# Patient Record
Sex: FEMALE | Race: WHITE | NOT HISPANIC OR LATINO | Employment: STUDENT | URBAN - NONMETROPOLITAN AREA
[De-identification: names, ages, dates, MRNs, and addresses within clinical notes are randomized per-mention and may not be internally consistent; named-entity substitution may affect disease eponyms.]

---
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
## 2025-01-29 ENCOUNTER — OFFICE VISIT (OUTPATIENT)
Dept: PSYCHIATRY | Facility: CLINIC | Age: 20
End: 2025-01-29
Payer: COMMERCIAL

## 2025-01-29 DIAGNOSIS — F41.1 GENERALIZED ANXIETY DISORDER: ICD-10-CM

## 2025-01-29 DIAGNOSIS — F33.2 SEVERE EPISODE OF RECURRENT MAJOR DEPRESSIVE DISORDER, WITHOUT PSYCHOTIC FEATURES: Primary | ICD-10-CM

## 2025-01-29 NOTE — PROGRESS NOTES
NAME: Lashon Harvey  DATE: 01/29/2025    Time:   4930-4526      DATA:          Individual Psychotherapy Session: Therapist encouraged patient to check-in regarding symptoms and how things are going. Therapist encouraged patient to share thoughts and feelings about being in group. Patient and therapist collaborate to update and develop individualized treatment goals. Therapist assisted patient in exploring thoughts and feelings surrounding something that's bothering patient. Therapist provided empathic listening.       Patient Response:     Patient arrived in person and participated in therapy today. Patient shared that she dropped out of college and is moving out of her boyfriend's apartment tomorrow because she realized she is miserable there. Reports she will move in with her mother in [place] and save up money to move to [place] to attend [employer] for music. Reports plans to get her drivers license. Patient reports daily suicidal thoughts, denies intent and plan. Patient reports self harm relapse a few weeks ago impulsively while in the shower, superficial cuts to leg. Reports watching self-harm content online the next day and since then she's been regularly self harming and watching self harm content. Therapist assists patient in creating plan to stop self harming. Patient deleted the wagner with the self harm content and deleted pictures she'd taken of her cuts. Patient agrees to try self harm coping mechanisms such as holding ice against the skin, screaming, hitting things, and drawing on skin.     Chief Complaint: self harm    ASSESSMENT:    PHQ-Score Total:  PHQ-9 Total Score:       NOELLE-7 Score Total:       MENTAL STATUS EXAM   General Appearance:  Cleanly groomed and dressed  Eye Contact:  Good eye contact  Attitude:  Cooperative  Motor Activity:  Normal gait, posture  Speech:  Normal rate, tone, volume  Mood and affect:  Normal, pleasant  Thought Process:  Logical  Associations/ Thought Content:  No  delusions  Hallucinations:  None  Suicidal Ideations:  Not present  Homicidal Ideation:  Not present  Insight:  Good  Judgement:  Good  Reliability:  Good  Impulse Control:  Good      Functional Status: Moderate impairment     Progress toward goal: Not at goal    Prognosis: Good with Ongoing Treatment     PLAN:  Patient will continue in therapy to work towards goals including decreased anxiety and depressed mood, increased ability to cope with symptoms, and establishment of a support network.     Impression/Formulation:    ICD-10-CM ICD-9-CM   1. Severe episode of recurrent major depressive disorder, without psychotic features  F33.2 296.33   2. Generalized anxiety disorder  F41.1 300.02       CLINICAL MANUVERING/INTERVENTIONS:  Therapist utilized a person-centered approach to build rapport with patient.  Therapist implemented motivational interviewing techniques to assist patient with exploring personal growth and change.   Therapist applied cognitive behavioral strategies to facilitate identification of maladaptive patterns of thinking and behavior. Therapist promoted safe nonjudgmental environment by providing patient with unconditional positive regard.  Therapist utilized dialectical behavior techniques to teach and model emotional regulation and relaxation.  Therapist assisted patient with identifying and implementing healthier coping strategies.  Patient was encouraged to make positive daily choices, and maintain healthy boundaries.        This document signed by Hakeem Kaur [facility], January 29, 2025, 12:14 EST

## 2025-02-12 ENCOUNTER — OFFICE VISIT (OUTPATIENT)
Dept: PSYCHIATRY | Facility: CLINIC | Age: 20
End: 2025-02-12
Payer: COMMERCIAL

## 2025-02-12 DIAGNOSIS — F33.2 SEVERE EPISODE OF RECURRENT MAJOR DEPRESSIVE DISORDER, WITHOUT PSYCHOTIC FEATURES: Primary | ICD-10-CM

## 2025-02-12 DIAGNOSIS — F41.1 GENERALIZED ANXIETY DISORDER: ICD-10-CM

## 2025-02-13 NOTE — PROGRESS NOTES
"     NAME: Lashon Harvey  DATE: 02/12/2025    Time:   0100-6250      DATA:          Individual Psychotherapy Session: Therapist encouraged patient to check-in regarding symptoms and how things are going. Therapist encouraged patient to share thoughts and feelings about being in group. Patient and therapist collaborate to update and develop individualized treatment goals. Therapist assisted patient in exploring thoughts and feelings surrounding something that's bothering patient. Therapist provided empathic listening.       Patient Response:     Patient arrived in person and participated in therapy today. Patient shared that she moved back home to her mom's house and also passed her 's test and got her learner's permit. Reports she hasn't been self-harming despite daily having urges to self-harm. Reports living at home is stressful because of all the arguing and having her mother and sister around all the time but it is better than living with her boyfriend. Patient reports that she recently had a \"meltdown\" in [facility] because of the color of the bright fluorescent lights, states that it was hurting her head and made her aware of how her clothes felt on her body and it made her cry and have to leave the store. Patient states that happens to her occasionally. Therapist provides education about sensory issues. Therapist assists patient in identifying goals for treatment. Patient identifies the following goals: increase self esteem, less mean to people, impulse control, be able to approach strangers, not let anxiety stop her from doing things, improved hygiene, make more time for her hobbies instead of being on her phone.     Chief Complaint: stress    ASSESSMENT:    PHQ-Score Total:  PHQ-9 Total Score:       NOELLE-7 Score Total:       MENTAL STATUS EXAM   General Appearance:  Cleanly groomed and dressed  Eye Contact:  Good eye contact  Attitude:  Cooperative  Motor Activity:  Normal gait, posture  Speech:  Normal " rate, tone, volume  Mood and affect:  Normal, pleasant  Thought Process:  Logical  Associations/ Thought Content:  No delusions  Hallucinations:  None  Suicidal Ideations:  Passive ideation  Homicidal Ideation:  Not present  Insight:  Good  Judgement:  Good  Reliability:  Good  Impulse Control:  Good      Functional Status: Moderate impairment     Progress toward goal: Not at goal    Prognosis: Good with Ongoing Treatment     PLAN:  Patient will continue in therapy to work towards goals including decreased anxiety and depressed mood, increased ability to cope with symptoms, and establishment of a support network.     Impression/Formulation:    ICD-10-CM ICD-9-CM   1. Severe episode of recurrent major depressive disorder, without psychotic features  F33.2 296.33   2. Generalized anxiety disorder  F41.1 300.02       CLINICAL MANUVERING/INTERVENTIONS:  Therapist utilized a person-centered approach to build rapport with patient.  Therapist implemented motivational interviewing techniques to assist patient with exploring personal growth and change.   Therapist applied cognitive behavioral strategies to facilitate identification of maladaptive patterns of thinking and behavior. Therapist promoted safe nonjudgmental environment by providing patient with unconditional positive regard.  Therapist utilized dialectical behavior techniques to teach and model emotional regulation and relaxation.  Therapist assisted patient with identifying and implementing healthier coping strategies.  Patient was encouraged to make positive daily choices, and maintain healthy boundaries.        This document signed by Hakeem Kaur [facility], February 13, 2025, 10:45 EST

## 2025-02-25 ENCOUNTER — OFFICE VISIT (OUTPATIENT)
Dept: PSYCHIATRY | Facility: CLINIC | Age: 20
End: 2025-02-25
Payer: COMMERCIAL

## 2025-02-25 DIAGNOSIS — F41.1 GENERALIZED ANXIETY DISORDER: ICD-10-CM

## 2025-02-25 DIAGNOSIS — F33.2 SEVERE EPISODE OF RECURRENT MAJOR DEPRESSIVE DISORDER, WITHOUT PSYCHOTIC FEATURES: Primary | ICD-10-CM

## 2025-03-12 ENCOUNTER — OFFICE VISIT (OUTPATIENT)
Dept: PSYCHIATRY | Facility: CLINIC | Age: 20
End: 2025-03-12
Payer: COMMERCIAL

## 2025-03-12 DIAGNOSIS — F41.1 GENERALIZED ANXIETY DISORDER: ICD-10-CM

## 2025-03-12 DIAGNOSIS — F33.2 SEVERE EPISODE OF RECURRENT MAJOR DEPRESSIVE DISORDER, WITHOUT PSYCHOTIC FEATURES: Primary | ICD-10-CM

## 2025-03-12 NOTE — PROGRESS NOTES
NAME: Lashon Harvey  DATE: 03/12/2025    Time:   3999-1014      DATA:          Individual Psychotherapy Session: Therapist encouraged patient to check-in regarding symptoms and how things are going. Therapist encouraged patient to share thoughts and feelings about being in group. Patient and therapist collaborate to update and develop individualized treatment goals. Therapist assisted patient in exploring thoughts and feelings surrounding something that's bothering patient. Therapist provided empathic listening.       Patient Response:     Patient arrived in person and participated in therapy today. Patient shared that she quit her job because she hated it. Reports that her mother is moving to a house where patient will have her own space, patient is excited about this. Patient states that she can't seem to say no to people for fear of hurting their feelings. Reports her fear of upsetting people is because she wants people to like her. Therapist encourages patient to try to say no to someone this week. Therapist provides education about assertiveness.     Chief Complaint: passivity    ASSESSMENT:    PHQ-Score Total:  PHQ-9 Total Score:       NOELLE-7 Score Total:       MENTAL STATUS EXAM   General Appearance:  Cleanly groomed and dressed  Eye Contact:  Good eye contact  Attitude:  Cooperative  Motor Activity:  Normal gait, posture  Speech:  Normal rate, tone, volume  Mood and affect:  Normal, pleasant  Thought Process:  Logical  Associations/ Thought Content:  No delusions  Hallucinations:  None  Suicidal Ideations:  Passive ideation  Homicidal Ideation:  Not present  Insight:  Good  Judgement:  Good  Reliability:  Good  Impulse Control:  Good      Functional Status: Moderate impairment     Progress toward goal: Not at goal    Prognosis: Good with Ongoing Treatment     PLAN:  Patient will continue in therapy to work towards goals including decreased anxiety and depressed mood, increased ability to cope with  symptoms, and establishment of a support network.     Impression/Formulation:    ICD-10-CM ICD-9-CM   1. Severe episode of recurrent major depressive disorder, without psychotic features  F33.2 296.33   2. Generalized anxiety disorder  F41.1 300.02       CLINICAL MANUVERING/INTERVENTIONS:  Therapist utilized a person-centered approach to build rapport with patient.  Therapist implemented motivational interviewing techniques to assist patient with exploring personal growth and change.   Therapist applied cognitive behavioral strategies to facilitate identification of maladaptive patterns of thinking and behavior. Therapist promoted safe nonjudgmental environment by providing patient with unconditional positive regard.  Therapist utilized dialectical behavior techniques to teach and model emotional regulation and relaxation.  Therapist assisted patient with identifying and implementing healthier coping strategies.  Patient was encouraged to make positive daily choices, and maintain healthy boundaries.        This document signed by Hakeem Kaur [facility], March 12, 2025, 15:44 EDT
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
## 2025-04-29 ENCOUNTER — OFFICE VISIT (OUTPATIENT)
Dept: PSYCHIATRY | Facility: CLINIC | Age: 20
End: 2025-04-29
Payer: COMMERCIAL

## 2025-04-29 DIAGNOSIS — F33.2 SEVERE EPISODE OF RECURRENT MAJOR DEPRESSIVE DISORDER, WITHOUT PSYCHOTIC FEATURES: Primary | ICD-10-CM

## 2025-04-29 DIAGNOSIS — F41.1 GENERALIZED ANXIETY DISORDER: ICD-10-CM

## 2025-04-29 PROCEDURE — 90834 PSYTX W PT 45 MINUTES: CPT

## 2025-04-29 NOTE — PROGRESS NOTES
NAME: Lashon Harvey  DATE: 04/29/2025    Time:   1412-2402      DATA:          Individual Psychotherapy Session: Therapist encouraged patient to check-in regarding symptoms and how things are going. Therapist encouraged patient to share thoughts and feelings about being in group. Patient and therapist collaborate to update and develop individualized treatment goals. Therapist assisted patient in exploring thoughts and feelings surrounding something that's bothering patient. Therapist provided empathic listening.       Patient Response:     Patient arrived in person and participated in therapy today. Patient shared that she broke up with her boyfriend which is a relief. Reports she has started using THC, is very stressed about her living situation, cannot find a job, is going through a lot of friend drama, and has started cutting again. Patient states that she is very stressed because of arguments at home with her family and wanting to not live there but not being able to leave and support herself financially with no 's license or income. Patient states that she does have four interviews this week. Patient reports she has thoughts of dying by suicide using her mother's blood pressure medication. Patient denies intent. Patient agrees to ask her mother to hide the medication.     Chief Complaint: stress    ASSESSMENT:    PHQ-Score Total:  PHQ-9 Total Score:       NOELLE-7 Score Total:       MENTAL STATUS EXAM   General Appearance:  Cleanly groomed and dressed  Eye Contact:  Good eye contact  Attitude:  Cooperative  Motor Activity:  Normal gait, posture  Speech:  Normal rate, tone, volume  Mood and affect:  Normal, pleasant  Thought Process:  Logical  Associations/ Thought Content:  No delusions  Hallucinations:  None  Suicidal Ideations:  Specific thoughts but denies intent  Homicidal Ideation:  Not present  Insight:  Good  Judgement:  Good  Reliability:  Good  Impulse Control:  Good      Functional Status:  Moderate impairment     Progress toward goal: Not at goal    Prognosis: Fair with Ongoing Treatment     PLAN:  Patient will continue in therapy to work towards goals including decreased anxiety and depressed mood, increased ability to cope with symptoms, and establishment of a support network.     Impression/Formulation:    ICD-10-CM ICD-9-CM   1. Severe episode of recurrent major depressive disorder, without psychotic features  F33.2 296.33   2. Generalized anxiety disorder  F41.1 300.02       CLINICAL MANUVERING/INTERVENTIONS:  Therapist utilized a person-centered approach to build rapport with patient.  Therapist implemented motivational interviewing techniques to assist patient with exploring personal growth and change.   Therapist applied cognitive behavioral strategies to facilitate identification of maladaptive patterns of thinking and behavior. Therapist promoted safe nonjudgmental environment by providing patient with unconditional positive regard.  Therapist utilized dialectical behavior techniques to teach and model emotional regulation and relaxation.  Therapist assisted patient with identifying and implementing healthier coping strategies.  Patient was encouraged to make positive daily choices, and maintain healthy boundaries.        This document signed by Hakeem Kaur [facility], April 29, 2025, 16:25 EDT

## 2025-05-22 ENCOUNTER — TELEPHONE (OUTPATIENT)
Dept: CARDIOLOGY | Facility: CLINIC | Age: 20
End: 2025-05-22
Payer: COMMERCIAL

## 2025-05-22 NOTE — TELEPHONE ENCOUNTER
Caller: Lashon Harvey    Relationship to patient: Self    Best call back number: [phone] (home)     Chief complaint: LIGHTHEADEDNESS/ CHEST PAIN - NO ACTIVE SYMPTOMS    Type of visit: F/U    Requested date: NEXT AVAILABLE    Additional notes:PT IS SAYING SHE NEEDS A CLEARANCE TO DRIVE DUE TO A HISTORY OR [employer]. PLEASE CALL PT WHEN AVAILABLE.

## 2025-05-23 ENCOUNTER — TELEPHONE (OUTPATIENT)
Dept: CARDIOLOGY | Facility: CLINIC | Age: 20
End: 2025-05-23
Payer: COMMERCIAL

## 2025-05-28 ENCOUNTER — OFFICE VISIT (OUTPATIENT)
Dept: CARDIOLOGY | Facility: CLINIC | Age: 20
End: 2025-05-28
Payer: COMMERCIAL

## 2025-05-28 VITALS
BODY MASS INDEX: 24.03 KG/M2 | DIASTOLIC BLOOD PRESSURE: 73 MMHG | HEART RATE: 70 BPM | HEIGHT: 63 IN | SYSTOLIC BLOOD PRESSURE: 112 MMHG | OXYGEN SATURATION: 99 % | WEIGHT: 135.6 LBS | RESPIRATION RATE: 16 BRPM

## 2025-05-28 DIAGNOSIS — R55 SYNCOPE AND COLLAPSE: Primary | ICD-10-CM

## 2025-05-28 PROCEDURE — 93000 ELECTROCARDIOGRAM COMPLETE: CPT | Performed by: NURSE PRACTITIONER

## 2025-05-28 PROCEDURE — 1160F RVW MEDS BY RX/DR IN RCRD: CPT | Performed by: NURSE PRACTITIONER

## 2025-05-28 PROCEDURE — 99213 OFFICE O/P EST LOW 20 MIN: CPT | Performed by: NURSE PRACTITIONER

## 2025-05-28 PROCEDURE — 1159F MED LIST DOCD IN RCRD: CPT | Performed by: NURSE PRACTITIONER

## 2025-05-28 NOTE — PROGRESS NOTES
[facility]             [facility] BERNARDO Lopes Mary Jean, APRN  Lashon Harvey  2005 05/28/2025    Patient Active Problem List   Diagnosis    Breast pain    Heart murmur    Dizziness    Palpitations    Adjustment disorder with mixed anxiety and depressed mood    Post traumatic stress disorder (PTSD)    Medication management    Syncope and collapse       Dear Tesha Dye APRN:    Subjective     Chief Complaint   Patient presents with    Heart Murmur     With syncope past hx    Med Management     verbal       HPI:     This is a 20 y.o. female with known past medical history of heart murmur.       Lashon Harvey presents today for routine cardiology follow up.   History of Present Illness  Patient has not been seen in our office since 2023 for which she was following for syncopal episodes.  Underwent cardiac workup at that time which showed echocardiogram with normal LV function and valvular function.  Cardiac event monitor at that time showed no arrhythmias.  She presents today stating that she needs clearance to drive as the DMV will not give her her license due to having history of syncope she has had no syncopal episodes since she was last seen in 2023.  It was felt that her syncopal episode previously was related to anxiety.  Blood pressure well-controlled.  Denies any chest pain or shortness of breath       Diagnostic Testing  Echocardiogram 6/2023: EF 61 to 65%  Cardiac event monitor 6/2023: Sinus rhythm no arrhythmias noted       All other systems were reviewed and were negative.    Patient Active Problem List   Diagnosis    Breast pain    Heart murmur    Dizziness    Palpitations    Adjustment disorder with mixed anxiety and depressed mood    Post traumatic stress disorder (PTSD)    Medication management    Syncope and collapse       family history includes Anxiety disorder in her father, mother, and sister; Depression in her father, mother, and  sister; Hypertension in her maternal grandmother; OCD in her sister.     reports that she has never smoked. She has never used smokeless tobacco. She reports that she does not drink alcohol and does not use drugs.    No Known Allergies      Current Outpatient Medications:     hydrOXYzine (ATARAX) 10 MG tablet, Take 1 tablet by mouth 3 (Three) Times a Day As Needed for Anxiety. Indications: Feeling Anxious, Disp: 90 tablet, Rfl: 0    loratadine (CLARITIN) 10 MG tablet, Take 1 tablet by mouth Daily., Disp: , Rfl:     mupirocin (BACTROBAN) 2 % ointment, APPLY SMALL AMOUNT TOPICALLY TO THE AFFECTED AREA TWICE DAILY FOR 10 DAYS, Disp: , Rfl:     sertraline (ZOLOFT) 25 MG tablet, Take 1 tablet by mouth Daily for 30 days., Disp: 30 tablet, Rfl: 0      Physical Exam:  I have reviewed the patient's current vital signs as documented in the patient's EMR.   Vitals:    05/28/25 1109   BP: 112/73   Pulse: 70   Resp: 16   SpO2: 99%     Body mass index is 24.02 kg/m².       05/28/25  1109   Weight: 61.5 kg (135 lb 9.6 oz)      Physical Exam  Constitutional:       General: She is not in acute distress.     Appearance: Normal appearance. She is well-developed and normal weight.   HENT:      Head: Normocephalic and atraumatic.   Eyes:      General: Lids are normal.      Conjunctiva/sclera: Conjunctivae normal.      Pupils: Pupils are equal, round, and reactive to light.   Neck:      Vascular: No carotid bruit or JVD.   Cardiovascular:      Rate and Rhythm: Normal rate and regular rhythm.      Pulses: Normal pulses.      Heart sounds: Normal heart sounds, S1 normal and S2 normal. No murmur heard.  Pulmonary:      Effort: Pulmonary effort is normal. No respiratory distress.      Breath sounds: Normal breath sounds. No wheezing.   Abdominal:      General: Bowel sounds are normal. There is no distension.      Palpations: Abdomen is soft. There is no hepatomegaly or splenomegaly.      Tenderness: There is no abdominal tenderness.    Musculoskeletal:         General: No swelling. Normal range of motion.      Cervical back: Normal range of motion and neck supple.      Right lower leg: No edema.      Left lower leg: No edema.   Skin:     General: Skin is warm and dry.      Coloration: Skin is not jaundiced.      Findings: No rash.   Neurological:      General: No focal deficit present.      Mental Status: She is alert and oriented to person, place, and time. Mental status is at baseline.   Psychiatric:         Mood and Affect: Mood normal.         Speech: Speech normal.         Behavior: Behavior normal.         Thought Content: Thought content normal.         Judgment: Judgment normal.          DATA REVIEWED:     TTE/RAMIRO:  Results for orders placed during the hospital encounter of 06/07/23    Adult Transthoracic Echo Complete w/ Color, Spectral and Contrast if necessary per protocol    Interpretation Summary    Left ventricular systolic function is normal. Left ventricular ejection fraction appears to be 61 - 65%.    Left ventricular diastolic function was normal.    Estimated right ventricular systolic pressure from tricuspid regurgitation is normal (<35 mmHg).      Laboratory evaluations:    Lab Results   Component Value Date    GLUCOSE 84 10/05/2023    BUN 12 10/05/2023    CREATININE 0.76 10/05/2023    EGFRIFNONA  12/18/2019      Comment:      Unable to calculate GFR, patient age <=18.    EGFRIFAFRI  12/18/2019      Comment:      Unable to calculate GFR, patient age <=18.    BCR 15.8 10/05/2023    K 3.8 10/05/2023    CO2 26.1 10/05/2023    CALCIUM 9.2 10/05/2023    ALBUMIN 4.4 10/05/2023    AST 22 10/05/2023    ALT 18 10/05/2023     Lab Results   Component Value Date    WBC 7.55 10/05/2023    HGB 13.0 10/05/2023    HCT 38.3 10/05/2023    MCV 92.5 10/05/2023     10/05/2023     Lab Results   Component Value Date    CHOL 128 06/09/2022    TRIG 45 06/09/2022    HDL 52 06/09/2022    LDL 65 06/09/2022     Lab Results   Component Value Date  "   TSH 1.110 06/09/2022     Lab Results   Component Value Date    HGBA1C 4.40 (L) 06/09/2022     Lab Results   Component Value Date    ALT 18 10/05/2023     Lab Results   Component Value Date    HGBA1C 4.40 (L) 06/09/2022     Lab Results   Component Value Date    CREATININE 0.76 10/05/2023     No results found for: \"IRON\", \"TIBC\", \"FERRITIN\"  No results found for: \"INR\", \"PROTIME\"   No components found for: \"ABSOLUTELUNG\"    ECG 12 Lead    Date/Time: 5/28/2025 11:43 AM  Performed by: Kamala Lopes APRN    Authorized by: Kamala Lopes APRN  Comparison: compared with previous ECG   Rhythm: sinus rhythm  Rate: normal    Clinical impression: normal ECG         --------------------------------------------------------------------------------------------------------------------------    ASSESSMENT/PLAN:      Diagnosis Plan   1. Syncope and collapse          Assessment & Plan  1. Medical clearance for driving  2. Hisotry of syncope  - No fainting episodes since 2023.  - Echocardiogram from 2023 showed no significant valve issues and normal LV fucntion  - Normal EKG today.  - Blood pressure within normal range.  - No cardiac concerns preventing driving.  - Note to be provided to [facility] indicating medical clearance for driving.      This document has been @Electronically signed by BERNARDO Sauceda, 05/28/25, 11:17 AM EDT.       Dictated Utilizing Dragon Dictation: Part of this note may be an electronic transcription/translation of spoken language to printed text using the Dragon Dictation System.  Patient or patient representative verbalized consent for the use of Ambient Listening during the visit with  BERNARDO Sauceda for chart documentation. 5/28/2025  11:43 EDT    Follow-up appointment and medication changes provided in hand delivered After Visit Summary as well as reviewed in the room.       "

## 2025-06-13 ENCOUNTER — OFFICE VISIT (OUTPATIENT)
Dept: PSYCHIATRY | Facility: CLINIC | Age: 20
End: 2025-06-13
Payer: COMMERCIAL

## 2025-06-13 ENCOUNTER — TELEPHONE (OUTPATIENT)
Dept: CARDIOLOGY | Facility: CLINIC | Age: 20
End: 2025-06-13
Payer: COMMERCIAL

## 2025-06-13 DIAGNOSIS — F33.2 SEVERE EPISODE OF RECURRENT MAJOR DEPRESSIVE DISORDER, WITHOUT PSYCHOTIC FEATURES: Primary | ICD-10-CM

## 2025-06-13 DIAGNOSIS — F41.1 GENERALIZED ANXIETY DISORDER: ICD-10-CM

## 2025-06-13 NOTE — PROGRESS NOTES
NAME: Lashon Harvey  DATE: 06/13/2025    Time:   7063-0889      DATA:          Individual Psychotherapy Session: Therapist encouraged patient to check-in regarding symptoms and how things are going. Therapist encouraged patient to share thoughts and feelings about being in group. Patient and therapist collaborate to update and develop individualized treatment goals. Therapist assisted patient in exploring thoughts and feelings surrounding something that's bothering patient. Therapist provided empathic listening.       Patient Response:     Patient arrived in person and participated in therapy today. Patient shared that she got a new job and a new boyfriend. Reports that her homelife has been very stressful and she feels like she has to walk on egg shells. Reports her mom snaps at her about a lot of things. Reports feeling like she can't speak her mind or else her mom will yell at her. Therapist provides education about boundary setting. Patient talks about feeling very sad at the prospect of eventually leaving the area if she moves to where her boyfriend lives, sad because she would be leaving her mom who only seems to rely on patient for emotional support. Reports feeling like she is all that her mom has.     Chief Complaint: family issues    ASSESSMENT:    PHQ-Score Total:  PHQ-9 Total Score:       NOELLE-7 Score Total:       MENTAL STATUS EXAM   General Appearance:  Cleanly groomed and dressed  Eye Contact:  Good eye contact  Attitude:  Cooperative  Motor Activity:  Normal gait, posture  Speech:  Normal rate, tone, volume  Mood and affect:  Normal, pleasant  Thought Process:  Logical  Associations/ Thought Content:  No delusions  Hallucinations:  None  Suicidal Ideations:  Passive ideation  Homicidal Ideation:  Not present  Insight:  Fair  Judgement:  Good  Reliability:  Good  Impulse Control:  Good      Functional Status: Moderate impairment     Progress toward goal: Not at goal    Prognosis: Good with Ongoing  Treatment     PLAN:  Patient will continue in therapy to work towards goals including decreased anxiety and depressed mood, increased ability to cope with symptoms, and establishment of a support network.     Impression/Formulation:    ICD-10-CM ICD-9-CM   1. Severe episode of recurrent major depressive disorder, without psychotic features  F33.2 296.33   2. Generalized anxiety disorder  F41.1 300.02       CLINICAL MANUVERING/INTERVENTIONS:  Therapist utilized a person-centered approach to build rapport with patient.  Therapist implemented motivational interviewing techniques to assist patient with exploring personal growth and change.   Therapist applied cognitive behavioral strategies to facilitate identification of maladaptive patterns of thinking and behavior. Therapist promoted safe nonjudgmental environment by providing patient with unconditional positive regard.  Therapist utilized dialectical behavior techniques to teach and model emotional regulation and relaxation.  Therapist assisted patient with identifying and implementing healthier coping strategies.  Patient was encouraged to make positive daily choices, and maintain healthy boundaries.        This document signed by Hakeem Kaur [facility], June 13, 2025, 13:32 EDT

## 2025-06-13 NOTE — TELEPHONE ENCOUNTER
Hub to relay.     Called pt to advise that she will need to get her PCP to fill out her driving form. Due to Kamala already clearing her to drive.     No answer SHAE.

## 2025-06-27 ENCOUNTER — OFFICE VISIT (OUTPATIENT)
Dept: PSYCHIATRY | Facility: CLINIC | Age: 20
End: 2025-06-27
Payer: COMMERCIAL

## 2025-06-27 DIAGNOSIS — F41.1 GENERALIZED ANXIETY DISORDER: ICD-10-CM

## 2025-06-27 DIAGNOSIS — F33.2 SEVERE EPISODE OF RECURRENT MAJOR DEPRESSIVE DISORDER, WITHOUT PSYCHOTIC FEATURES: Primary | ICD-10-CM

## 2025-06-27 NOTE — PROGRESS NOTES
NAME: Lashon Harvey  DATE: 06/27/2025    Time:   9916-1473      DATA:          Individual Psychotherapy Session: Therapist encouraged patient to check-in regarding symptoms and how things are going. Therapist encouraged patient to share thoughts and feelings about being in group. Patient and therapist collaborate to update and develop individualized treatment goals. Therapist assisted patient in exploring thoughts and feelings surrounding something that's bothering patient. Therapist provided empathic listening.       Patient Response:     Patient arrived in person and participated in therapy today. Patient shared that she is very stressed about her living situation. Reports her family takes advantage of her financially and is also stressful to be around. Reports she has to give her mother half of her paycheck but then her mother uses the money unwisely and that no one else in the house is contributing financially even though they could be. Patient states that she wouldn't want to switch to directly paying the bills herself instead of giving the money to her mother because that could upset her mother. Therapist provides education about how putting up boundaries requires being okay with upsetting people. Patient reports some suicidal thoughts but denies plan or intent.     Chief Complaint: stressful living situation, poor boundaries    ASSESSMENT:    PHQ-Score Total:  PHQ-9 Total Score:       NOELLE-7 Score Total:       MENTAL STATUS EXAM   General Appearance:  Cleanly groomed and dressed  Eye Contact:  Good eye contact  Attitude:  Cooperative  Motor Activity:  Normal gait, posture  Speech:  Normal rate, tone, volume  Mood and affect:  Normal, pleasant  Thought Process:  Logical  Associations/ Thought Content:  No delusions  Hallucinations:  None  Suicidal Ideations:  Not present  Homicidal Ideation:  Not present  Insight:  Fair  Judgement:  Fair  Reliability:  Good  Impulse Control:  Good      Functional Status:  Moderate impairment     Progress toward goal: Not at goal    Prognosis: Fair with Ongoing Treatment     PLAN:  Patient will continue in therapy to work towards goals including decreased anxiety and depressed mood, increased ability to cope with symptoms, and establishment of a support network.     Impression/Formulation:    ICD-10-CM ICD-9-CM   1. Severe episode of recurrent major depressive disorder, without psychotic features  F33.2 296.33   2. Generalized anxiety disorder  F41.1 300.02       CLINICAL MANUVERING/INTERVENTIONS:  Therapist utilized a person-centered approach to build rapport with patient.  Therapist implemented motivational interviewing techniques to assist patient with exploring personal growth and change.   Therapist applied cognitive behavioral strategies to facilitate identification of maladaptive patterns of thinking and behavior. Therapist promoted safe nonjudgmental environment by providing patient with unconditional positive regard.  Therapist utilized dialectical behavior techniques to teach and model emotional regulation and relaxation.  Therapist assisted patient with identifying and implementing healthier coping strategies.  Patient was encouraged to make positive daily choices, and maintain healthy boundaries.        This document signed by [facility]em [facility]aur [facility], June 27, 2025, 15:30 EDT

## 2025-07-23 ENCOUNTER — HOSPITAL ENCOUNTER (EMERGENCY)
Facility: HOSPITAL | Age: 20
Discharge: HOME | End: 2025-07-23
Payer: COMMERCIAL

## 2025-07-23 VITALS
RESPIRATION RATE: 16 BRPM | HEART RATE: 63 BPM | BODY MASS INDEX: 24.63 KG/M2 | OXYGEN SATURATION: 98 % | HEIGHT: 63 IN | WEIGHT: 139 LBS | TEMPERATURE: 98.8 F | SYSTOLIC BLOOD PRESSURE: 123 MMHG | DIASTOLIC BLOOD PRESSURE: 59 MMHG

## 2025-07-23 DIAGNOSIS — L02.01 ABSCESS OF EYEBROW: Primary | ICD-10-CM

## 2025-07-23 PROCEDURE — 99283 EMERGENCY DEPT VISIT LOW MDM: CPT

## 2025-07-23 RX ORDER — DOXYCYCLINE 100 MG/1
100 CAPSULE ORAL 2 TIMES DAILY
Qty: 20 CAPSULE | Refills: 0 | Status: SHIPPED | OUTPATIENT
Start: 2025-07-23 | End: 2025-08-02

## 2025-07-23 RX ORDER — DOXYCYCLINE 100 MG/1
100 CAPSULE ORAL ONCE
Status: COMPLETED | OUTPATIENT
Start: 2025-07-23 | End: 2025-07-23

## 2025-07-23 RX ADMIN — DOXYCYCLINE 100 MG: 100 CAPSULE ORAL at 22:37

## 2025-07-23 RX ADMIN — Medication 3 ML: at 22:07

## 2025-07-24 NOTE — DISCHARGE INSTRUCTIONS
Follow up with your family doctor in the office at the next available appointment. Warm compresses to the area for 10 minutes at a time and then take a break. Do this for the next couple days. Return to the ED if symptoms change or worsen.

## 2025-07-24 NOTE — ED NOTES
MEDICAL SCREENING:    Reason for Visit: infection to right eyebrow piercing    Patient initially seen in triage.  The patient was advised further evaluation and diagnostic testing will be needed, some of the treatment and testing will be initiated in the lobby in order to begin the process.  The patient will be returned to the waiting area for the time being and possibly be re-assessed by a subsequent ED provider.  The patient will be brought back to the treatment area in as timely manner as possible.      Caryn Snyder, PA  07/23/25 [id number]

## 2025-07-24 NOTE — ED PROVIDER NOTES
Subjective   History of Present Illness  20-year-old female who presents to the ED today for a possible infection to her right eyebrow piercing.  She states she got her eyebrow pierced on April 3.  She noticed a bump in the area on May 12.  She states today the piercing got snagged on a pillow and the area opened up and drained purulent drainage.  She states the area is tender to palpation.  She denies any fever.  She states she is currently prescribed Zoloft.    History provided by:  Patient  Abscess  Location:  Face  Facial abscess location:  R eyebrow  Size:  0.5 cm  Abscess quality: fluctuance, painful and redness    Red streaking: no    Progression:  Worsening  Chronicity:  New  Relieved by:  Nothing  Worsened by:  Nothing  Associated symptoms: no fever, no nausea and no vomiting        Review of Systems   Constitutional: Negative.  Negative for fever.   HENT: Negative.     Eyes: Negative.    Respiratory: Negative.     Cardiovascular: Negative.    Gastrointestinal:  Negative for nausea and vomiting.   Genitourinary: Negative.    Musculoskeletal: Negative.    Skin:  Positive for wound.   Neurological: Negative.    Psychiatric/Behavioral: Negative.     All other systems reviewed and are negative.      Past Medical History:   Diagnosis Date    Heart murmur     Panic attacks     patient reports they started in middle school.       No Known Allergies    Past Surgical History:   Procedure Laterality Date    ADENOIDECTOMY      TONSILLECTOMY         Family History   Problem Relation Age of Onset    Depression Mother     Anxiety disorder Mother     Depression Father     Anxiety disorder Father     OCD Sister     Depression Sister     Anxiety disorder Sister     Hypertension Maternal Grandmother        Social History     Socioeconomic History    Marital status: Single   Tobacco Use    Smoking status: Never    Smokeless tobacco: Never   Vaping Use    Vaping status: Never Used   Substance and Sexual Activity    Alcohol  use: Never    Drug use: Never    Sexual activity: Defer           Objective   Physical Exam  Vitals and nursing note reviewed.   Constitutional:       General: She is not in acute distress.     Appearance: Normal appearance.   HENT:      Head: Normocephalic and atraumatic.      Right Ear: External ear normal.      Left Ear: External ear normal.      Nose: Nose normal.   Eyes:      Conjunctiva/sclera: Conjunctivae normal.      Pupils: Pupils are equal, round, and reactive to light.   Cardiovascular:      Rate and Rhythm: Normal rate and regular rhythm.      Pulses: Normal pulses.      Heart sounds: Normal heart sounds.   Pulmonary:      Effort: Pulmonary effort is normal.      Breath sounds: Normal breath sounds.   Abdominal:      General: Bowel sounds are normal.      Palpations: Abdomen is soft.   Musculoskeletal:         General: Normal range of motion.      Cervical back: Normal range of motion and neck supple.   Skin:     General: Skin is warm and dry.      Capillary Refill: Capillary refill takes less than 2 seconds.      Comments: 0.5 cm abscess to right eyebrow adjacent to eyebrow piercing   Neurological:      General: No focal deficit present.      Mental Status: She is alert and oriented to person, place, and time.   Psychiatric:         Mood and Affect: Mood normal.         Incision & Drainage    Date/Time: 7/23/2025 10:20 PM    Performed by: Caryn Snyder PA  Authorized by: Carlo Francis MD    Consent:     Consent obtained:  Verbal    Consent given by:  Patient  Location:     Type:  Abscess    Size:  0.5 cm    Location:  Head    Head location:  Face (right eyebrow)  Pre-procedure details:     Skin preparation:  Chlorhexidine  Sedation:     Sedation type:  None  Anesthesia:     Anesthesia method:  Topical application    Topical anesthetic:  LET  Procedure type:     Complexity:  Simple  Procedure details:     Incision types:  Stab incision    Incision depth:  Dermal    Drainage:  Purulent    Drainage  amount:  Scant    Wound treatment:  Wound left open    Packing materials:  None  Post-procedure details:     Procedure completion:  Tolerated well, no immediate complications             ED Course                                                       Medical Decision Making  20-year-old female presents to the ED today for an abscess adjacent to her right eyebrow piercing.  Area was opened and she reports feeling much better.  She will be started on doxycycline and was advised to do warm compresses.  She will return to the ED if symptoms change or worsen.    Problems Addressed:  Abscess of eyebrow: complicated acute illness or injury    Risk  Prescription drug management.        Final diagnoses:   Abscess of eyebrow       ED Disposition  ED Disposition       ED Disposition   Discharge    Condition   Stable    Comment   --               Rox Huynh, APRN  [address] [facility] [id number] [phone]    Schedule an appointment as soon as possible for a visit in 2 days  For wound re-check         Medication List        New Prescriptions      doxycycline 100 MG capsule  Commonly known as: MONODOX  Take 1 capsule by mouth 2 (Two) Times a Day for 10 days.               Where to Get Your Medications        These medications were sent to [facility] [id number] - [place] - [address] [facility] AT [address] [place] [address] - [phone]  - [phone] FX  [address] [facility] [address]      Phone: [phone]   doxycycline 100 MG capsule            Caryn Snyder PA  07/24/25 6032

## 2025-07-26 ENCOUNTER — HOSPITAL ENCOUNTER (EMERGENCY)
Facility: HOSPITAL | Age: 20
Discharge: HOME | End: 2025-07-26
Payer: COMMERCIAL

## 2025-07-26 VITALS
SYSTOLIC BLOOD PRESSURE: 96 MMHG | WEIGHT: 138 LBS | HEIGHT: 63 IN | DIASTOLIC BLOOD PRESSURE: 70 MMHG | TEMPERATURE: 97.9 F | OXYGEN SATURATION: 99 % | HEART RATE: 73 BPM | RESPIRATION RATE: 17 BRPM | BODY MASS INDEX: 24.45 KG/M2

## 2025-07-26 DIAGNOSIS — R51.9 GENERALIZED HEADACHE: Primary | ICD-10-CM

## 2025-07-26 LAB
ALBUMIN SERPL-MCNC: 4.4 G/DL (ref 3.5–5.2)
ALBUMIN/GLOB SERPL: 1.4 G/DL
ALP SERPL-CCNC: 78 U/L (ref 39–117)
ALT SERPL W P-5'-P-CCNC: 7 U/L (ref 1–33)
ANION GAP SERPL CALCULATED.3IONS-SCNC: 11 MMOL/L (ref 5–15)
AST SERPL-CCNC: 20 U/L (ref 1–32)
B-HCG UR QL: NEGATIVE
BACTERIA UR QL AUTO: ABNORMAL /HPF
BASOPHILS # BLD AUTO: 0.04 10*3/MM3 (ref 0–0.2)
BASOPHILS NFR BLD AUTO: 0.8 % (ref 0–1.5)
BILIRUB SERPL-MCNC: 0.4 MG/DL (ref 0–1.2)
BILIRUB UR QL STRIP: NEGATIVE
BUN SERPL-MCNC: 11 MG/DL (ref 6–20)
BUN/CREAT SERPL: 15.5 (ref 7–25)
CALCIUM SPEC-SCNC: 8.8 MG/DL (ref 8.6–10.5)
CHLORIDE SERPL-SCNC: 101 MMOL/L (ref 98–107)
CLARITY UR: CLEAR
CO2 SERPL-SCNC: 26 MMOL/L (ref 22–29)
COLOR UR: ABNORMAL
CREAT SERPL-MCNC: 0.71 MG/DL (ref 0.57–1)
DEPRECATED RDW RBC AUTO: 38.5 FL (ref 37–54)
EGFRCR SERPLBLD CKD-EPI 2021: 125 ML/MIN/1.73
EOSINOPHIL # BLD AUTO: 0.12 10*3/MM3 (ref 0–0.4)
EOSINOPHIL NFR BLD AUTO: 2.4 % (ref 0.3–6.2)
ERYTHROCYTE [DISTWIDTH] IN BLOOD BY AUTOMATED COUNT: 11.3 % (ref 12.3–15.4)
GLOBULIN UR ELPH-MCNC: 3.1 GM/DL
GLUCOSE SERPL-MCNC: 87 MG/DL (ref 65–99)
GLUCOSE UR STRIP-MCNC: NEGATIVE MG/DL
HCT VFR BLD AUTO: 37.5 % (ref 34–46.6)
HGB BLD-MCNC: 12.7 G/DL (ref 12–15.9)
HGB UR QL STRIP.AUTO: NEGATIVE
HOLD SPECIMEN: NORMAL
HYALINE CASTS UR QL AUTO: ABNORMAL /LPF
IMM GRANULOCYTES # BLD AUTO: 0.01 10*3/MM3 (ref 0–0.05)
IMM GRANULOCYTES NFR BLD AUTO: 0.2 % (ref 0–0.5)
KETONES UR QL STRIP: ABNORMAL
LEUKOCYTE ESTERASE UR QL STRIP.AUTO: ABNORMAL
LYMPHOCYTES # BLD AUTO: 1.75 10*3/MM3 (ref 0.7–3.1)
LYMPHOCYTES NFR BLD AUTO: 34.3 % (ref 19.6–45.3)
MCH RBC QN AUTO: 31.6 PG (ref 26.6–33)
MCHC RBC AUTO-ENTMCNC: 33.9 G/DL (ref 31.5–35.7)
MCV RBC AUTO: 93.3 FL (ref 79–97)
MONOCYTES # BLD AUTO: 0.3 10*3/MM3 (ref 0.1–0.9)
MONOCYTES NFR BLD AUTO: 5.9 % (ref 5–12)
NEUTROPHILS NFR BLD AUTO: 2.88 10*3/MM3 (ref 1.7–7)
NEUTROPHILS NFR BLD AUTO: 56.4 % (ref 42.7–76)
NITRITE UR QL STRIP: NEGATIVE
NRBC BLD AUTO-RTO: 0 /100 WBC (ref 0–0.2)
PH UR STRIP.AUTO: 5.5 [PH] (ref 5–8)
PLATELET # BLD AUTO: 298 10*3/MM3 (ref 140–450)
PMV BLD AUTO: 8.4 FL (ref 6–12)
POTASSIUM SERPL-SCNC: 3.8 MMOL/L (ref 3.5–5.2)
PROT SERPL-MCNC: 7.5 G/DL (ref 6–8.5)
PROT UR QL STRIP: NEGATIVE
RBC # BLD AUTO: 4.02 10*6/MM3 (ref 3.77–5.28)
RBC # UR STRIP: ABNORMAL /HPF
REF LAB TEST METHOD: ABNORMAL
SODIUM SERPL-SCNC: 138 MMOL/L (ref 136–145)
SP GR UR STRIP: 1.03 (ref 1–1.03)
SQUAMOUS #/AREA URNS HPF: ABNORMAL /HPF
UROBILINOGEN UR QL STRIP: ABNORMAL
WBC # UR STRIP: ABNORMAL /HPF
WBC NRBC COR # BLD AUTO: 5.1 10*3/MM3 (ref 3.4–10.8)

## 2025-07-26 PROCEDURE — 25010000002 PROCHLORPERAZINE 10 MG/2ML SOLUTION: Performed by: PHYSICIAN ASSISTANT

## 2025-07-26 PROCEDURE — 96374 THER/PROPH/DIAG INJ IV PUSH: CPT

## 2025-07-26 PROCEDURE — 96375 TX/PRO/DX INJ NEW DRUG ADDON: CPT

## 2025-07-26 PROCEDURE — 25810000003 SODIUM CHLORIDE 0.9 % SOLUTION: Performed by: PHYSICIAN ASSISTANT

## 2025-07-26 PROCEDURE — 25010000002 KETOROLAC TROMETHAMINE PER 15 MG: Performed by: PHYSICIAN ASSISTANT

## 2025-07-26 PROCEDURE — 99283 EMERGENCY DEPT VISIT LOW MDM: CPT

## 2025-07-26 PROCEDURE — 81025 URINE PREGNANCY TEST: CPT | Performed by: PHYSICIAN ASSISTANT

## 2025-07-26 PROCEDURE — 36415 COLL VENOUS BLD VENIPUNCTURE: CPT

## 2025-07-26 PROCEDURE — 80053 COMPREHEN METABOLIC PANEL: CPT | Performed by: PHYSICIAN ASSISTANT

## 2025-07-26 PROCEDURE — 81001 URINALYSIS AUTO W/SCOPE: CPT | Performed by: PHYSICIAN ASSISTANT

## 2025-07-26 PROCEDURE — 85025 COMPLETE CBC W/AUTO DIFF WBC: CPT | Performed by: PHYSICIAN ASSISTANT

## 2025-07-26 PROCEDURE — 25010000002 DEXAMETHASONE PER 1 MG: Performed by: PHYSICIAN ASSISTANT

## 2025-07-26 RX ORDER — PROCHLORPERAZINE EDISYLATE 5 MG/ML
5 INJECTION INTRAMUSCULAR; INTRAVENOUS ONCE
Status: COMPLETED | OUTPATIENT
Start: 2025-07-26 | End: 2025-07-26

## 2025-07-26 RX ORDER — DEXAMETHASONE SODIUM PHOSPHATE 4 MG/ML
4 INJECTION, SOLUTION INTRA-ARTICULAR; INTRALESIONAL; INTRAMUSCULAR; INTRAVENOUS; SOFT TISSUE ONCE
Status: COMPLETED | OUTPATIENT
Start: 2025-07-26 | End: 2025-07-26

## 2025-07-26 RX ORDER — SODIUM CHLORIDE 0.9 % (FLUSH) 0.9 %
10 SYRINGE (ML) INJECTION AS NEEDED
Status: DISCONTINUED | OUTPATIENT
Start: 2025-07-26 | End: 2025-07-26 | Stop reason: HOSPADM

## 2025-07-26 RX ORDER — KETOROLAC TROMETHAMINE 30 MG/ML
30 INJECTION, SOLUTION INTRAMUSCULAR; INTRAVENOUS ONCE
Status: COMPLETED | OUTPATIENT
Start: 2025-07-26 | End: 2025-07-26

## 2025-07-26 RX ORDER — PROCHLORPERAZINE MALEATE 10 MG
5 TABLET ORAL EVERY 8 HOURS PRN
Qty: 10 TABLET | Refills: 0 | Status: SHIPPED | OUTPATIENT
Start: 2025-07-26

## 2025-07-26 RX ADMIN — DEXAMETHASONE SODIUM PHOSPHATE 4 MG: 4 INJECTION, SOLUTION INTRA-ARTICULAR; INTRALESIONAL; INTRAMUSCULAR; INTRAVENOUS; SOFT TISSUE at 12:27

## 2025-07-26 RX ADMIN — SODIUM CHLORIDE 1000 ML: 9 INJECTION, SOLUTION INTRAVENOUS at 11:09

## 2025-07-26 RX ADMIN — PROCHLORPERAZINE EDISYLATE 5 MG: 5 INJECTION INTRAMUSCULAR; INTRAVENOUS at 11:09

## 2025-07-26 RX ADMIN — KETOROLAC TROMETHAMINE 30 MG: 30 INJECTION, SOLUTION INTRAMUSCULAR at 11:09

## 2025-07-26 NOTE — Clinical Note
[facility] EMERGENCY DEPARTMENT  [address] [facility] [address]  Phone: [phone]    Lashon Harvey was seen and treated in our emergency department on 7/26/2025.  She may return to work on 07/28/2025.         Thank you for choosing [facility].    Jared Vieira II, PA

## 2025-07-26 NOTE — ED PROVIDER NOTES
Subjective   History of Present Illness  20-year-old female presents to the ER chief complaint of headache.  Patient verbalizes headache has been present for the last 9 days.  Had had headaches in the past and was evaluated however there was no etiology identified behind her headaches.  Recently started Zoloft about 4 days ago.  States she is currently on antibiotics secondary to a skin infection her right eyebrow.  Verbalize a history of a near syncope incident while at work.  Does not think she is pregnant, last cycle ended 4 days ago.        Review of Systems   Constitutional: Negative.  Negative for fever.   Respiratory: Negative.     Cardiovascular: Negative.  Negative for chest pain.   Gastrointestinal:  Positive for nausea. Negative for abdominal pain.   Endocrine: Negative.    Genitourinary: Negative.  Negative for dysuria.   Skin: Negative.    Neurological:  Positive for syncope and headaches.   Psychiatric/Behavioral: Negative.     All other systems reviewed and are negative.      Past Medical History:   Diagnosis Date    Heart murmur     Panic attacks     patient reports they started in middle school.       No Known Allergies    Past Surgical History:   Procedure Laterality Date    ADENOIDECTOMY      TONSILLECTOMY         Family History   Problem Relation Age of Onset    Depression Mother     Anxiety disorder Mother     Depression Father     Anxiety disorder Father     OCD Sister     Depression Sister     Anxiety disorder Sister     Hypertension Maternal Grandmother        Social History     Socioeconomic History    Marital status: Single   Tobacco Use    Smoking status: Never    Smokeless tobacco: Never   Vaping Use    Vaping status: Never Used   Substance and Sexual Activity    Alcohol use: Never    Drug use: Never    Sexual activity: Defer           Objective   Physical Exam  Vitals and nursing note reviewed.   Constitutional:       General: She is not in acute distress.     Appearance: She is  well-developed. She is not diaphoretic.   HENT:      Head: Normocephalic and atraumatic.      Right Ear: External ear normal.      Left Ear: External ear normal.      Nose: Nose normal.   Eyes:      Extraocular Movements: Extraocular movements intact.      Conjunctiva/sclera: Conjunctivae normal.      Pupils: Pupils are equal, round, and reactive to light.   Neck:      Vascular: No JVD.      Trachea: No tracheal deviation.   Cardiovascular:      Rate and Rhythm: Normal rate and regular rhythm.      Heart sounds: Normal heart sounds. No murmur heard.  Pulmonary:      Effort: Pulmonary effort is normal. No respiratory distress.      Breath sounds: Normal breath sounds. No wheezing.   Abdominal:      Palpations: Abdomen is soft.      Tenderness: There is no abdominal tenderness.   Musculoskeletal:         General: No deformity. Normal range of motion.      Cervical back: Normal range of motion and neck supple.   Skin:     General: Skin is warm and dry.      Coloration: Skin is not pale.      Findings: No erythema or rash.   Neurological:      Mental Status: She is alert and oriented to person, place, and time.      Cranial Nerves: No cranial nerve deficit.   Psychiatric:         Behavior: Behavior normal.         Thought Content: Thought content normal.         Procedures           ED Course  ED Course as of 07/26/25 1134   Sat Jul 26, 2025   1047 Patient is not suicidal   [RB]      ED Course User Index  [RB] Jared Vieira II, PA                                                       Medical Decision Making  Amount and/or Complexity of Data Reviewed  Labs: ordered.    Risk  Prescription drug management.        Final diagnoses:   Generalized headache       ED Disposition  ED Disposition       ED Disposition   Discharge    Condition   Stable    Comment   --               Rox Huynh, APRN  [address] [id number]  [phone]    Schedule an appointment as soon as possible for a visit            Medication  List        New Prescriptions      prochlorperazine 10 MG tablet  Commonly known as: COMPAZINE  Take 0.5 tablets by mouth Every 8 (Eight) Hours As Needed for Nausea (headache).               Where to Get Your Medications        These medications were sent to [facility] [id number] - A[place]ON CHRISTENSEN - [address] [facility] - [phone]  - [phone] FX  [address] [place] [address]      Phone: [phone]   prochlorperazine 10 MG tablet            Jared Vieira II, PA  07/26/25 1931

## 2025-08-05 ENCOUNTER — OFFICE VISIT (OUTPATIENT)
Dept: PSYCHIATRY | Facility: CLINIC | Age: 20
End: 2025-08-05
Payer: COMMERCIAL

## 2025-08-05 DIAGNOSIS — F41.1 GENERALIZED ANXIETY DISORDER: ICD-10-CM

## 2025-08-05 DIAGNOSIS — F33.2 SEVERE EPISODE OF RECURRENT MAJOR DEPRESSIVE DISORDER, WITHOUT PSYCHOTIC FEATURES: Primary | ICD-10-CM

## 2025-08-05 PROCEDURE — 90834 PSYTX W PT 45 MINUTES: CPT

## 2025-08-07 ENCOUNTER — OFFICE VISIT (OUTPATIENT)
Dept: PSYCHIATRY | Facility: CLINIC | Age: 20
End: 2025-08-07
Payer: COMMERCIAL

## 2025-08-07 VITALS
SYSTOLIC BLOOD PRESSURE: 114 MMHG | HEIGHT: 63 IN | BODY MASS INDEX: 24.45 KG/M2 | HEART RATE: 69 BPM | WEIGHT: 138 LBS | DIASTOLIC BLOOD PRESSURE: 69 MMHG

## 2025-08-07 DIAGNOSIS — F43.10 POST TRAUMATIC STRESS DISORDER (PTSD): ICD-10-CM

## 2025-08-07 DIAGNOSIS — F41.1 GENERALIZED ANXIETY DISORDER: ICD-10-CM

## 2025-08-07 DIAGNOSIS — Z79.899 MEDICATION MANAGEMENT: Primary | ICD-10-CM

## 2025-08-07 DIAGNOSIS — F33.2 SEVERE EPISODE OF RECURRENT MAJOR DEPRESSIVE DISORDER, WITHOUT PSYCHOTIC FEATURES: ICD-10-CM

## 2025-08-07 LAB
AMPHET+METHAMPHET UR QL: NEGATIVE
AMPHETAMINES UR QL: NEGATIVE
BARBITURATES UR QL SCN: NEGATIVE
BENZODIAZ UR QL SCN: NEGATIVE
BUPRENORPHINE SERPL-MCNC: NEGATIVE NG/ML
CANNABINOIDS SERPL QL: NEGATIVE
COCAINE UR QL: NEGATIVE
MDMA UR QL SCN: NEGATIVE
METHADONE UR QL SCN: NEGATIVE
MORPHINE/OPIATES SCREEN, URINE: NEGATIVE
OXYCODONE UR QL SCN: NEGATIVE
PCP UR QL SCN: NEGATIVE
PROPOXYPH UR QL SCN: NEGATIVE
TRICYCLICS UR QL SCN: NEGATIVE

## 2025-08-07 RX ORDER — NORGESTIMATE AND ETHINYL ESTRADIOL 0.25-0.035
1 KIT ORAL DAILY
COMMUNITY
Start: 2025-05-28

## 2025-08-08 LAB
6-ACETYLMORPHINE: NOT DETECTED NG/ML
ALCOHOL, ETHYL: NOT DETECTED MG/DL
AMPHETAMINE: NOT DETECTED NG/ML
BENZODIAZ BLD QL: NOT DETECTED NG/ML
BUPRENORPHINE SAL CFM-MCNC: NOT DETECTED NG/ML
COCAINE METABOLITE: NOT DETECTED NG/ML
CREATININE: 259.6 MG/DL
EDDP SERPL QL: NOT DETECTED NG/ML
FENTANYL-EIA: NOT DETECTED NG/ML
METHAMPHETAMINE: NOT DETECTED NG/ML
OPIATES: NOT DETECTED NG/ML
OXIDANTS: NOT DETECTED UG/ML
OXYCODONE: NOT DETECTED NG/ML
PCP: NOT DETECTED NG/ML
PH: 5.9 (ref 4.5–9)
SPECIFIC GRAVITY, QUAN: 1.02 (ref 1–1.03)
THC: NOT DETECTED NG/ML

## 2025-08-09 LAB — REF LAB TEST METHOD: NORMAL
